# Patient Record
Sex: FEMALE | Race: WHITE
[De-identification: names, ages, dates, MRNs, and addresses within clinical notes are randomized per-mention and may not be internally consistent; named-entity substitution may affect disease eponyms.]

---

## 2017-11-22 ENCOUNTER — RX ONLY (RX ONLY)
Age: 37
End: 2017-11-22

## 2017-11-22 ENCOUNTER — DOCTOR'S OFFICE (OUTPATIENT)
Dept: URBAN - NONMETROPOLITAN AREA CLINIC 1 | Facility: CLINIC | Age: 37
Setting detail: OPHTHALMOLOGY
End: 2017-11-22
Payer: COMMERCIAL

## 2017-11-22 DIAGNOSIS — H02.401: ICD-10-CM

## 2017-11-22 DIAGNOSIS — H01.001: ICD-10-CM

## 2017-11-22 DIAGNOSIS — H04.123: ICD-10-CM

## 2017-11-22 DIAGNOSIS — H10.13: ICD-10-CM

## 2017-11-22 DIAGNOSIS — H01.005: ICD-10-CM

## 2017-11-22 DIAGNOSIS — H01.004: ICD-10-CM

## 2017-11-22 DIAGNOSIS — H01.002: ICD-10-CM

## 2017-11-22 PROCEDURE — 92002 INTRM OPH EXAM NEW PATIENT: CPT | Performed by: OPHTHALMOLOGY

## 2017-11-22 ASSESSMENT — REFRACTION_MANIFEST
OD_VA1: 20/
OS_VA1: 20/
OD_VA2: 20/
OS_VA1: 20/
OU_VA: 20/
OU_VA: 20/
OD_VA3: 20/
OD_VA1: 20/
OS_VA1: 20/
OS_VA3: 20/
OD_VA3: 20/
OD_VA2: 20/
OS_VA2: 20/
OS_VA3: 20/
OD_VA3: 20/
OS_VA3: 20/
OD_VA1: 20/
OS_VA2: 20/
OS_VA2: 20/
OU_VA: 20/
OD_VA2: 20/

## 2017-11-22 ASSESSMENT — SUPERFICIAL PUNCTATE KERATITIS (SPK)
OD_SPK: 1+
OS_SPK: T

## 2017-11-22 ASSESSMENT — REFRACTION_AUTOREFRACTION
OS_AXIS: 098
OS_CYLINDER: -1.50
OD_CYLINDER: -1.50
OD_AXIS: 089
OD_SPHERE: +0.25
OS_SPHERE: +0.50

## 2017-11-22 ASSESSMENT — REFRACTION_CURRENTRX
OD_OVR_VA: 20/
OS_OVR_VA: 20/
OS_OVR_VA: 20/
OD_OVR_VA: 20/
OD_OVR_VA: 20/
OS_OVR_VA: 20/

## 2017-11-22 ASSESSMENT — VISUAL ACUITY
OS_BCVA: 20/30+2
OD_BCVA: 20/50-2

## 2017-11-22 ASSESSMENT — SPHEQUIV_DERIVED
OS_SPHEQUIV: -0.25
OD_SPHEQUIV: -0.5

## 2017-11-22 ASSESSMENT — LID EXAM ASSESSMENTS
OD_BLEPHARITIS: 1+
OS_BLEPHARITIS: 1+

## 2017-11-22 ASSESSMENT — LID POSITION - PTOSIS: OD_PTOSIS: RUL T

## 2018-05-02 ENCOUNTER — DOCTOR'S OFFICE (OUTPATIENT)
Dept: URBAN - NONMETROPOLITAN AREA CLINIC 1 | Facility: CLINIC | Age: 38
Setting detail: OPHTHALMOLOGY
End: 2018-05-02
Payer: COMMERCIAL

## 2018-05-02 DIAGNOSIS — H02.401: ICD-10-CM

## 2018-05-02 DIAGNOSIS — H04.123: ICD-10-CM

## 2018-05-02 DIAGNOSIS — H10.13: ICD-10-CM

## 2018-05-02 PROCEDURE — 92012 INTRM OPH EXAM EST PATIENT: CPT | Performed by: OPHTHALMOLOGY

## 2018-05-02 ASSESSMENT — SUPERFICIAL PUNCTATE KERATITIS (SPK)
OD_SPK: 1+
OS_SPK: T

## 2018-05-02 ASSESSMENT — REFRACTION_MANIFEST
OS_VA3: 20/
OD_VA2: 20/
OS_VA3: 20/
OS_VA1: 20/
OD_VA2: 20/
OU_VA: 20/
OS_VA2: 20/
OS_VA2: 20/
OD_VA3: 20/
OD_VA1: 20/
OD_VA1: 20/
OS_VA2: 20/
OS_VA1: 20/
OD_VA2: 20/
OD_VA3: 20/
OD_VA3: 20/
OS_VA1: 20/
OS_VA3: 20/
OU_VA: 20/
OU_VA: 20/
OD_VA1: 20/

## 2018-05-02 ASSESSMENT — REFRACTION_CURRENTRX
OS_OVR_VA: 20/
OD_OVR_VA: 20/
OS_OVR_VA: 20/
OS_OVR_VA: 20/
OD_OVR_VA: 20/
OD_OVR_VA: 20/

## 2018-05-02 ASSESSMENT — REFRACTION_AUTOREFRACTION
OD_AXIS: 081
OD_SPHERE: +0.75
OS_AXIS: 101
OS_CYLINDER: -2.50
OS_SPHERE: +1.50
OD_CYLINDER: -2.25

## 2018-05-02 ASSESSMENT — LID POSITION - PTOSIS: OD_PTOSIS: RUL T

## 2018-05-02 ASSESSMENT — SPHEQUIV_DERIVED
OD_SPHEQUIV: -0.375
OS_SPHEQUIV: 0.25

## 2018-05-02 ASSESSMENT — VISUAL ACUITY
OS_BCVA: 20/30
OD_BCVA: 20/40-2

## 2018-05-02 ASSESSMENT — CONFRONTATIONAL VISUAL FIELD TEST (CVF)
OD_FINDINGS: FULL
OS_FINDINGS: FULL

## 2018-05-02 ASSESSMENT — LID EXAM ASSESSMENTS
OS_BLEPHARITIS: 1+
OD_BLEPHARITIS: 1+

## 2020-02-11 ENCOUNTER — DOCTOR'S OFFICE (OUTPATIENT)
Dept: URBAN - NONMETROPOLITAN AREA CLINIC 1 | Facility: CLINIC | Age: 40
Setting detail: OPHTHALMOLOGY
End: 2020-02-11
Payer: COMMERCIAL

## 2020-02-11 VITALS — HEIGHT: 55 IN

## 2020-02-11 DIAGNOSIS — H02.401: ICD-10-CM

## 2020-02-11 DIAGNOSIS — H04.123: ICD-10-CM

## 2020-02-11 DIAGNOSIS — H10.13: ICD-10-CM

## 2020-02-11 PROCEDURE — 99214 OFFICE O/P EST MOD 30 MIN: CPT | Performed by: OPHTHALMOLOGY

## 2020-02-11 ASSESSMENT — LID EXAM ASSESSMENTS
OD_BLEPHARITIS: 1+
OS_BLEPHARITIS: 1+

## 2020-02-11 ASSESSMENT — REFRACTION_AUTOREFRACTION
OD_SPHERE: +0.75
OS_SPHERE: +1.50
OS_AXIS: 101
OS_CYLINDER: -2.50
OD_CYLINDER: -2.25
OD_AXIS: 081

## 2020-02-11 ASSESSMENT — SPHEQUIV_DERIVED
OD_SPHEQUIV: -0.375
OS_SPHEQUIV: 0.25

## 2020-02-11 ASSESSMENT — VISUAL ACUITY
OD_BCVA: 20/50
OS_BCVA: 20/40

## 2020-02-11 ASSESSMENT — SUPERFICIAL PUNCTATE KERATITIS (SPK)
OD_SPK: 1+
OS_SPK: T

## 2020-02-11 ASSESSMENT — LID POSITION - PTOSIS: OD_PTOSIS: RUL T

## 2020-02-11 ASSESSMENT — CONFRONTATIONAL VISUAL FIELD TEST (CVF)
OD_FINDINGS: FULL
OS_FINDINGS: FULL

## 2020-03-03 ENCOUNTER — DOCTOR'S OFFICE (OUTPATIENT)
Dept: URBAN - NONMETROPOLITAN AREA CLINIC 1 | Facility: CLINIC | Age: 40
Setting detail: OPHTHALMOLOGY
End: 2020-03-03
Payer: COMMERCIAL

## 2020-03-03 ENCOUNTER — OPTICAL OFFICE (OUTPATIENT)
Dept: URBAN - NONMETROPOLITAN AREA CLINIC 4 | Facility: CLINIC | Age: 40
Setting detail: OPHTHALMOLOGY
End: 2020-03-03
Payer: COMMERCIAL

## 2020-03-03 VITALS — HEIGHT: 55 IN

## 2020-03-03 DIAGNOSIS — H52.223: ICD-10-CM

## 2020-03-03 DIAGNOSIS — H52.03: ICD-10-CM

## 2020-03-03 PROCEDURE — V2784 LENS POLYCARB OR EQUAL: HCPCS | Performed by: OPTOMETRIST

## 2020-03-03 PROCEDURE — V2103 SPHEROCYLINDR 4.00D/12-2.00D: HCPCS | Performed by: OPTOMETRIST

## 2020-03-03 PROCEDURE — V2020 VISION SVCS FRAMES PURCHASES: HCPCS | Performed by: OPTOMETRIST

## 2020-03-03 PROCEDURE — V2102 SINGL VISN SPHERE 7.12-20.00: HCPCS | Performed by: OPTOMETRIST

## 2020-03-03 PROCEDURE — 92015 DETERMINE REFRACTIVE STATE: CPT | Performed by: OPTOMETRIST

## 2020-03-03 ASSESSMENT — REFRACTION_MANIFEST
OD_CYLINDER: -1.50
OS_AXIS: 095
OD_VA1: 20/25
OS_CYLINDER: -1.25
OD_AXIS: 090
OS_VA1: 20/25-2
OS_VA2: 20/25
OD_VA2: 20/25
OS_SPHERE: +0.25
OD_SPHERE: +0.50

## 2020-03-03 ASSESSMENT — SPHEQUIV_DERIVED
OD_SPHEQUIV: -0.25
OS_SPHEQUIV: -0.375
OS_SPHEQUIV: 0.75
OD_SPHEQUIV: 0.125

## 2020-03-03 ASSESSMENT — REFRACTION_AUTOREFRACTION
OD_AXIS: 83
OS_CYLINDER: +1.00
OS_SPHERE: +0.25
OD_SPHERE: +1.00
OS_AXIS: 1.00
OD_CYLINDER: -1.75

## 2020-03-03 ASSESSMENT — VISUAL ACUITY
OD_BCVA: 20/60-1
OS_BCVA: 20/25-2

## 2020-11-20 ENCOUNTER — DOCTOR'S OFFICE (OUTPATIENT)
Dept: URBAN - NONMETROPOLITAN AREA CLINIC 1 | Facility: CLINIC | Age: 40
Setting detail: OPHTHALMOLOGY
End: 2020-11-20
Payer: COMMERCIAL

## 2020-11-20 ENCOUNTER — RX ONLY (RX ONLY)
Age: 40
End: 2020-11-20

## 2020-11-20 ENCOUNTER — OPTICAL OFFICE (OUTPATIENT)
Dept: URBAN - NONMETROPOLITAN AREA CLINIC 4 | Facility: CLINIC | Age: 40
Setting detail: OPHTHALMOLOGY
End: 2020-11-20
Payer: COMMERCIAL

## 2020-11-20 DIAGNOSIS — H02.401: ICD-10-CM

## 2020-11-20 DIAGNOSIS — H04.123: ICD-10-CM

## 2020-11-20 DIAGNOSIS — H01.001: ICD-10-CM

## 2020-11-20 DIAGNOSIS — H01.002: ICD-10-CM

## 2020-11-20 DIAGNOSIS — H52.223: ICD-10-CM

## 2020-11-20 PROBLEM — H15.111 EPISCLERITIS; RIGHT EYE: Status: ACTIVE | Noted: 2020-11-20

## 2020-11-20 PROBLEM — H01.004 BLEPHARITIS; RIGHT UPPER LID, RIGHT LOWER LID, LEFT UPPER LID, LEFT LOWER LID: Status: ACTIVE | Noted: 2017-11-22

## 2020-11-20 PROBLEM — L71.8: Status: ACTIVE | Noted: 2020-11-20

## 2020-11-20 PROBLEM — H01.005 BLEPHARITIS; RIGHT UPPER LID, RIGHT LOWER LID, LEFT UPPER LID, LEFT LOWER LID: Status: ACTIVE | Noted: 2017-11-22

## 2020-11-20 PROBLEM — H10.13 ALLERGIC CONJUNCTIVITIS; BOTH EYES: Status: RESOLVED | Noted: 2017-11-22 | Resolved: 2020-11-20

## 2020-11-20 PROCEDURE — V2103 SPHEROCYLINDR 4.00D/12-2.00D: HCPCS | Performed by: OPTOMETRIST

## 2020-11-20 PROCEDURE — 92012 INTRM OPH EXAM EST PATIENT: CPT | Performed by: OPTOMETRIST

## 2020-11-20 PROCEDURE — V2102 SINGL VISN SPHERE 7.12-20.00: HCPCS | Performed by: OPTOMETRIST

## 2020-11-20 PROCEDURE — V2784 LENS POLYCARB OR EQUAL: HCPCS | Performed by: OPTOMETRIST

## 2020-11-20 PROCEDURE — V2020 VISION SVCS FRAMES PURCHASES: HCPCS | Performed by: OPTOMETRIST

## 2020-11-20 ASSESSMENT — VISUAL ACUITY
OS_BCVA: 20/30-2
OD_BCVA: 20/60-2

## 2020-11-20 ASSESSMENT — REFRACTION_AUTOREFRACTION
OS_SPHERE: +0.25
OD_AXIS: 83
OS_AXIS: 1.00
OD_CYLINDER: -1.75
OS_CYLINDER: +1.00
OD_SPHERE: +1.00

## 2020-11-20 ASSESSMENT — SPHEQUIV_DERIVED
OS_SPHEQUIV: 0.75
OD_SPHEQUIV: 0.125
OS_SPHEQUIV: -0.375
OD_SPHEQUIV: -0.25

## 2020-11-20 ASSESSMENT — REFRACTION_MANIFEST
OS_AXIS: 095
OD_SPHERE: +0.50
OD_CYLINDER: -1.50
OD_VA2: 20/25
OS_CYLINDER: -1.25
OD_VA1: 20/25
OD_AXIS: 090
OS_SPHERE: +0.25
OS_VA2: 20/25
OS_VA1: 20/25-2

## 2020-11-20 ASSESSMENT — LID EXAM ASSESSMENTS
OS_BLEPHARITIS: 1+
OD_BLEPHARITIS: 1+

## 2020-11-20 ASSESSMENT — SUPERFICIAL PUNCTATE KERATITIS (SPK)
OS_SPK: T
OD_SPK: 1+

## 2020-11-20 ASSESSMENT — TONOMETRY
OD_IOP_MMHG: 18
OS_IOP_MMHG: 18

## 2020-11-20 ASSESSMENT — CONFRONTATIONAL VISUAL FIELD TEST (CVF)
OS_FINDINGS: FULL
OD_FINDINGS: FULL

## 2020-11-20 ASSESSMENT — LID POSITION - PTOSIS: OD_PTOSIS: RUL T

## 2023-08-30 ENCOUNTER — DOCTOR'S OFFICE (OUTPATIENT)
Dept: URBAN - NONMETROPOLITAN AREA CLINIC 1 | Facility: CLINIC | Age: 43
Setting detail: OPHTHALMOLOGY
End: 2023-08-30
Payer: COMMERCIAL

## 2023-08-30 VITALS — HEIGHT: 55 IN

## 2023-08-30 DIAGNOSIS — H10.13: ICD-10-CM

## 2023-08-30 DIAGNOSIS — H35.373: ICD-10-CM

## 2023-08-30 DIAGNOSIS — H43.811: ICD-10-CM

## 2023-08-30 DIAGNOSIS — D23.121: ICD-10-CM

## 2023-08-30 DIAGNOSIS — H25.13: ICD-10-CM

## 2023-08-30 DIAGNOSIS — H43.392: ICD-10-CM

## 2023-08-30 DIAGNOSIS — H11.153: ICD-10-CM

## 2023-08-30 PROCEDURE — 92285 EXTERNAL OCULAR PHOTOGRAPHY: CPT | Performed by: OPHTHALMOLOGY

## 2023-08-30 PROCEDURE — 92134 CPTRZ OPH DX IMG PST SGM RTA: CPT | Performed by: OPHTHALMOLOGY

## 2023-08-30 PROCEDURE — 92014 COMPRE OPH EXAM EST PT 1/>: CPT | Performed by: OPHTHALMOLOGY

## 2023-08-30 ASSESSMENT — VISUAL ACUITY
OD_BCVA: 20/40-2
OS_BCVA: 20/30+2

## 2023-08-30 ASSESSMENT — KERATOMETRY
OD_AXISANGLE_DEGREES: 158
OS_K1POWER_DIOPTERS: 42.25
OS_AXISANGLE_DEGREES: 017
OS_K2POWER_DIOPTERS: 43.50
OD_K2POWER_DIOPTERS: 44.00
OD_K1POWER_DIOPTERS: 42.75

## 2023-08-30 ASSESSMENT — AXIALLENGTH_DERIVED
OD_AL: 23.7361
OD_AL: 23.7855
OS_AL: 23.9737
OS_AL: 23.8237

## 2023-08-30 ASSESSMENT — REFRACTION_AUTOREFRACTION
OS_CYLINDER: -2.50
OD_CYLINDER: -2.25
OD_AXIS: 091
OD_SPHERE: +0.75
OS_SPHERE: +1.25
OS_AXIS: 100

## 2023-08-30 ASSESSMENT — SPHEQUIV_DERIVED
OS_SPHEQUIV: 0
OD_SPHEQUIV: -0.375
OD_SPHEQUIV: -0.25
OS_SPHEQUIV: -0.375

## 2023-08-30 ASSESSMENT — REFRACTION_MANIFEST
OD_AXIS: 090
OD_VA2: 20/25
OS_VA1: 20/25-2
OD_VA1: 20/25
OS_SPHERE: +0.25
OD_CYLINDER: -1.50
OS_VA2: 20/25
OS_AXIS: 095
OD_SPHERE: +0.50
OS_CYLINDER: -1.25

## 2023-08-30 ASSESSMENT — REFRACTION_CURRENTRX
OD_OVR_VA: 20/
OS_OVR_VA: 20/

## 2023-08-30 ASSESSMENT — CONFRONTATIONAL VISUAL FIELD TEST (CVF)
OS_FINDINGS: FULL
OD_FINDINGS: FULL

## 2023-12-27 ENCOUNTER — DOCTOR'S OFFICE (OUTPATIENT)
Dept: URBAN - NONMETROPOLITAN AREA CLINIC 1 | Facility: CLINIC | Age: 43
Setting detail: OPHTHALMOLOGY
End: 2023-12-27
Payer: COMMERCIAL

## 2023-12-27 ENCOUNTER — RX ONLY (RX ONLY)
Age: 43
End: 2023-12-27

## 2023-12-27 DIAGNOSIS — H10.13: ICD-10-CM

## 2023-12-27 DIAGNOSIS — D23.121: ICD-10-CM

## 2023-12-27 PROCEDURE — 99213 OFFICE O/P EST LOW 20 MIN: CPT | Performed by: OPHTHALMOLOGY

## 2023-12-27 ASSESSMENT — REFRACTION_AUTOREFRACTION
OS_SPHERE: +1.00
OS_AXIS: 094
OD_SPHERE: +1.00
OD_AXIS: 085
OD_CYLINDER: -2.50
OS_CYLINDER: -1.75

## 2023-12-27 ASSESSMENT — CONFRONTATIONAL VISUAL FIELD TEST (CVF)
OD_FINDINGS: FULL
OS_FINDINGS: FULL

## 2023-12-27 ASSESSMENT — REFRACTION_MANIFEST
OD_SPHERE: +0.50
OD_CYLINDER: -1.50
OS_AXIS: 095
OD_AXIS: 090
OS_VA1: 20/25-2
OD_VA2: 20/25
OD_VA1: 20/25
OS_SPHERE: +0.25
OS_VA2: 20/25
OS_CYLINDER: -1.25

## 2023-12-27 ASSESSMENT — SPHEQUIV_DERIVED
OD_SPHEQUIV: -0.25
OS_SPHEQUIV: 0.125
OS_SPHEQUIV: -0.375
OD_SPHEQUIV: -0.25

## 2023-12-27 ASSESSMENT — REFRACTION_CURRENTRX
OD_OVR_VA: 20/
OS_OVR_VA: 20/

## 2024-01-19 ENCOUNTER — DOCTOR'S OFFICE (OUTPATIENT)
Dept: URBAN - NONMETROPOLITAN AREA CLINIC 1 | Facility: CLINIC | Age: 44
Setting detail: OPHTHALMOLOGY
End: 2024-01-19
Payer: COMMERCIAL

## 2024-01-19 ENCOUNTER — RX ONLY (RX ONLY)
Age: 44
End: 2024-01-19

## 2024-01-19 DIAGNOSIS — H01.001: ICD-10-CM

## 2024-01-19 DIAGNOSIS — H10.13: ICD-10-CM

## 2024-01-19 DIAGNOSIS — D23.121: ICD-10-CM

## 2024-01-19 DIAGNOSIS — H01.004: ICD-10-CM

## 2024-01-19 PROCEDURE — 99213 OFFICE O/P EST LOW 20 MIN: CPT | Performed by: OPHTHALMOLOGY

## 2024-01-19 ASSESSMENT — REFRACTION_AUTOREFRACTION
OD_CYLINDER: -2.00
OD_SPHERE: +0.25
OS_CYLINDER: -2.50
OS_AXIS: 098
OS_SPHERE: +0.75
OD_AXIS: 075

## 2024-01-19 ASSESSMENT — REFRACTION_MANIFEST
OS_VA1: 20/25-2
OS_SPHERE: +0.25
OD_AXIS: 090
OD_SPHERE: +0.50
OD_VA1: 20/25
OS_AXIS: 095
OS_VA2: 20/25
OD_VA2: 20/25
OS_CYLINDER: -1.25
OD_CYLINDER: -1.50

## 2024-01-19 ASSESSMENT — LID EXAM ASSESSMENTS
OD_BLEPHARITIS: 1+
OS_BLEPHARITIS: 1+

## 2024-01-19 ASSESSMENT — SPHEQUIV_DERIVED
OD_SPHEQUIV: -0.75
OS_SPHEQUIV: -0.5
OD_SPHEQUIV: -0.25
OS_SPHEQUIV: -0.375

## 2024-01-19 ASSESSMENT — REFRACTION_CURRENTRX
OS_OVR_VA: 20/
OD_OVR_VA: 20/

## 2024-01-19 ASSESSMENT — CONFRONTATIONAL VISUAL FIELD TEST (CVF)
OD_FINDINGS: FULL
OS_FINDINGS: FULL

## 2024-02-08 ENCOUNTER — APPOINTMENT (RX ONLY)
Dept: URBAN - NONMETROPOLITAN AREA CLINIC 4 | Facility: CLINIC | Age: 44
Setting detail: DERMATOLOGY
End: 2024-02-08

## 2024-02-08 DIAGNOSIS — L24.2 IRRITANT CONTACT DERMATITIS DUE TO SOLVENTS: ICD-10-CM | Status: INADEQUATELY CONTROLLED

## 2024-02-08 PROCEDURE — ? TREATMENT REGIMEN

## 2024-02-08 PROCEDURE — ? COUNSELING

## 2024-02-08 PROCEDURE — 99203 OFFICE O/P NEW LOW 30 MIN: CPT

## 2024-02-08 PROCEDURE — ? PRESCRIPTION

## 2024-02-08 RX ORDER — HYDROCORTISONE 25 MG/G
2.5% CREAM TOPICAL BID
Qty: 30 | Refills: 2 | Status: ERX | COMMUNITY
Start: 2024-02-08

## 2024-02-08 RX ORDER — CETIRIZINE HCL 1 MG/ML
10MG SOLUTION, ORAL ORAL QD
Qty: 30 | Refills: 3 | Status: ERX | COMMUNITY
Start: 2024-02-08

## 2024-02-08 RX ADMIN — Medication 10MG: at 00:00

## 2024-02-08 RX ADMIN — HYDROCORTISONE 2.5%: 25 CREAM TOPICAL at 00:00

## 2024-02-08 ASSESSMENT — ITCH NUMERIC RATING SCALE: HOW SEVERE IS YOUR ITCHING?: 4

## 2024-02-08 ASSESSMENT — LOCATION ZONE DERM: LOCATION ZONE: FACE

## 2024-02-08 ASSESSMENT — PAIN INTENSITY VAS: HOW INTENSE IS YOUR PAIN 0 BEING NO PAIN, 10 BEING THE MOST SEVERE PAIN POSSIBLE?: NO PAIN

## 2024-02-08 ASSESSMENT — LOCATION SIMPLE DESCRIPTION DERM
LOCATION SIMPLE: RIGHT CHEEK
LOCATION SIMPLE: LEFT CHEEK

## 2024-02-08 ASSESSMENT — LOCATION DETAILED DESCRIPTION DERM
LOCATION DETAILED: LEFT INFERIOR CENTRAL MALAR CHEEK
LOCATION DETAILED: RIGHT SUPERIOR MEDIAL BUCCAL CHEEK

## 2024-02-08 ASSESSMENT — SEVERITY ASSESSMENT 2020: SEVERITY 2020: MILD

## 2024-02-08 ASSESSMENT — BSA RASH: BSA RASH: 8

## 2024-02-08 NOTE — PROCEDURE: TREATMENT REGIMEN
Samples Given: Cetaphil Moisturizer cream + Cetaphil Gentle cleanser .
Plan: Will follow up in 6 weeks .
Initiate Treatment: Hydrocortisone 2.5% topical cream Mix 50/50 Equal Amounts of Cetaphil moisturizer recommended AA on face BID as needed for irritation ; Zyrtec 10mg QD .
Detail Level: Zone

## 2024-03-01 ENCOUNTER — RX ONLY (OUTPATIENT)
Age: 44
Setting detail: RX ONLY
End: 2024-03-01

## 2024-03-01 RX ORDER — CETIRIZINE HCL 1 MG/ML
10MG SOLUTION, ORAL ORAL QD
Qty: 90 | Refills: 3 | Status: ERX

## 2024-03-13 ENCOUNTER — APPOINTMENT (RX ONLY)
Dept: URBAN - NONMETROPOLITAN AREA CLINIC 4 | Facility: CLINIC | Age: 44
Setting detail: DERMATOLOGY
End: 2024-03-13

## 2024-03-13 DIAGNOSIS — L24.2 IRRITANT CONTACT DERMATITIS DUE TO SOLVENTS: ICD-10-CM | Status: RESOLVED

## 2024-03-13 PROCEDURE — ? PRESCRIPTION MEDICATION MANAGEMENT

## 2024-03-13 PROCEDURE — 99213 OFFICE O/P EST LOW 20 MIN: CPT

## 2024-03-13 PROCEDURE — ? COUNSELING

## 2024-03-13 ASSESSMENT — LOCATION DETAILED DESCRIPTION DERM
LOCATION DETAILED: LEFT INFERIOR MEDIAL MALAR CHEEK
LOCATION DETAILED: RIGHT INFERIOR CENTRAL MALAR CHEEK

## 2024-03-13 ASSESSMENT — BSA RASH: BSA RASH: 2

## 2024-03-13 ASSESSMENT — LOCATION SIMPLE DESCRIPTION DERM
LOCATION SIMPLE: LEFT CHEEK
LOCATION SIMPLE: RIGHT CHEEK

## 2024-03-13 ASSESSMENT — ITCH NUMERIC RATING SCALE: HOW SEVERE IS YOUR ITCHING?: 2

## 2024-03-13 ASSESSMENT — LOCATION ZONE DERM: LOCATION ZONE: FACE

## 2024-03-13 ASSESSMENT — SEVERITY ASSESSMENT 2020: SEVERITY 2020: CLEAR

## 2024-03-13 NOTE — PROCEDURE: PRESCRIPTION MEDICATION MANAGEMENT
Discontinue Regimen: Hydrocortisone 2.5% cream
Continue Regimen: Zyrtec 10 mg
Detail Level: Zone
Render In Strict Bullet Format?: No

## 2024-03-15 ENCOUNTER — DOCTOR'S OFFICE (OUTPATIENT)
Dept: URBAN - NONMETROPOLITAN AREA CLINIC 1 | Facility: CLINIC | Age: 44
Setting detail: OPHTHALMOLOGY
End: 2024-03-15
Payer: COMMERCIAL

## 2024-03-15 DIAGNOSIS — H01.004: ICD-10-CM

## 2024-03-15 DIAGNOSIS — H01.001: ICD-10-CM

## 2024-03-15 DIAGNOSIS — H10.13: ICD-10-CM

## 2024-03-15 PROCEDURE — 99213 OFFICE O/P EST LOW 20 MIN: CPT | Performed by: OPHTHALMOLOGY

## 2024-03-15 ASSESSMENT — LID EXAM ASSESSMENTS
OS_BLEPHARITIS: 1+
OD_BLEPHARITIS: 1+

## 2024-03-22 ENCOUNTER — DOCTOR'S OFFICE (OUTPATIENT)
Dept: URBAN - NONMETROPOLITAN AREA CLINIC 1 | Facility: CLINIC | Age: 44
Setting detail: OPHTHALMOLOGY
End: 2024-03-22
Payer: COMMERCIAL

## 2024-03-22 ENCOUNTER — OPTICAL OFFICE (OUTPATIENT)
Dept: URBAN - NONMETROPOLITAN AREA CLINIC 4 | Facility: CLINIC | Age: 44
Setting detail: OPHTHALMOLOGY
End: 2024-03-22
Payer: COMMERCIAL

## 2024-03-22 ENCOUNTER — RX ONLY (RX ONLY)
Age: 44
End: 2024-03-22

## 2024-03-22 DIAGNOSIS — H52.223: ICD-10-CM

## 2024-03-22 DIAGNOSIS — H52.4: ICD-10-CM

## 2024-03-22 PROCEDURE — V2020 VISION SVCS FRAMES PURCHASES: HCPCS | Performed by: OPTOMETRIST

## 2024-03-22 PROCEDURE — V2784 LENS POLYCARB OR EQUAL: HCPCS | Performed by: OPTOMETRIST

## 2024-03-22 PROCEDURE — V2784 LENS POLYCARB OR EQUAL: HCPCS | Mod: LT | Performed by: OPTOMETRIST

## 2024-03-22 PROCEDURE — V2204 LENS SPHCY BIFOCAL 4.00D/2.1: HCPCS | Mod: LT | Performed by: OPTOMETRIST

## 2024-03-22 PROCEDURE — V2204 LENS SPHCY BIFOCAL 4.00D/2.1: HCPCS | Performed by: OPTOMETRIST

## 2024-03-22 PROCEDURE — 92012 INTRM OPH EXAM EST PATIENT: CPT | Performed by: OPTOMETRIST

## 2024-03-22 PROCEDURE — 92015 DETERMINE REFRACTIVE STATE: CPT | Performed by: OPTOMETRIST

## 2024-03-22 ASSESSMENT — LID EXAM ASSESSMENTS
OS_BLEPHARITIS: 1+
OD_BLEPHARITIS: 1+

## 2024-05-17 ENCOUNTER — DOCTOR'S OFFICE (OUTPATIENT)
Dept: URBAN - NONMETROPOLITAN AREA CLINIC 1 | Facility: CLINIC | Age: 44
Setting detail: OPHTHALMOLOGY
End: 2024-05-17
Payer: COMMERCIAL

## 2024-05-17 VITALS — HEIGHT: 55 IN

## 2024-05-17 DIAGNOSIS — H10.13: ICD-10-CM

## 2024-05-17 PROCEDURE — 99213 OFFICE O/P EST LOW 20 MIN: CPT | Performed by: OPHTHALMOLOGY

## 2024-05-17 ASSESSMENT — LID EXAM ASSESSMENTS
OD_BLEPHARITIS: 1+
OS_BLEPHARITIS: 1+

## 2024-05-24 ENCOUNTER — OPTICAL OFFICE (OUTPATIENT)
Dept: URBAN - NONMETROPOLITAN AREA CLINIC 4 | Facility: CLINIC | Age: 44
Setting detail: OPHTHALMOLOGY
End: 2024-05-24
Payer: COMMERCIAL

## 2024-05-24 DIAGNOSIS — H52.223: ICD-10-CM

## 2024-05-24 PROCEDURE — V2784 LENS POLYCARB OR EQUAL: HCPCS | Performed by: OPTOMETRIST

## 2024-05-24 PROCEDURE — V2204 LENS SPHCY BIFOCAL 4.00D/2.1: HCPCS | Performed by: OPTOMETRIST

## 2024-05-24 PROCEDURE — V2204 LENS SPHCY BIFOCAL 4.00D/2.1: HCPCS | Mod: LT | Performed by: OPTOMETRIST

## 2024-05-24 PROCEDURE — V2784 LENS POLYCARB OR EQUAL: HCPCS | Mod: LT | Performed by: OPTOMETRIST

## 2024-05-24 PROCEDURE — V2020 VISION SVCS FRAMES PURCHASES: HCPCS | Performed by: OPTOMETRIST

## 2024-09-25 ENCOUNTER — DOCTOR'S OFFICE (OUTPATIENT)
Dept: URBAN - NONMETROPOLITAN AREA CLINIC 1 | Facility: CLINIC | Age: 44
Setting detail: OPHTHALMOLOGY
End: 2024-09-25
Payer: COMMERCIAL

## 2024-09-25 DIAGNOSIS — H01.001: ICD-10-CM

## 2024-09-25 DIAGNOSIS — H02.015: ICD-10-CM

## 2024-09-25 DIAGNOSIS — H10.503: ICD-10-CM

## 2024-09-25 DIAGNOSIS — H02.011: ICD-10-CM

## 2024-09-25 DIAGNOSIS — H01.004: ICD-10-CM

## 2024-09-25 DIAGNOSIS — H04.551: ICD-10-CM

## 2024-09-25 DIAGNOSIS — H02.012: ICD-10-CM

## 2024-09-25 DIAGNOSIS — H25.13: ICD-10-CM

## 2024-09-25 DIAGNOSIS — H02.014: ICD-10-CM

## 2024-09-25 PROBLEM — H11.153 PINGUECULA;  ,, BOTH EYES: Status: ACTIVE | Noted: 2024-09-25

## 2024-09-25 PROCEDURE — 92012 INTRM OPH EXAM EST PATIENT: CPT | Performed by: OPHTHALMOLOGY

## 2024-09-25 ASSESSMENT — REFRACTION_AUTOREFRACTION
OD_CYLINDER: -2.25
OS_CYLINDER: -2.25
OD_SPHERE: +2.25
OD_AXIS: 056
OS_SPHERE: +1.50
OS_AXIS: 095

## 2024-09-25 ASSESSMENT — CONFRONTATIONAL VISUAL FIELD TEST (CVF)
OS_FINDINGS: FULL
OD_FINDINGS: FULL

## 2024-09-25 ASSESSMENT — REFRACTION_MANIFEST
OS_VA1: 20/25-2
OD_ADD: +1.25
OS_AXIS: 100
OD_CYLINDER: -2.50
OS_VA2: 20/25
OD_AXIS: 090
OS_SPHERE: +1.00
OS_CYLINDER: -2.50
OD_SPHERE: +1.25
OD_VA1: 20/25
OS_ADD: +1.25
OD_VA2: 20/25

## 2024-09-25 ASSESSMENT — TONOMETRY
OS_IOP_MMHG: 15
OD_IOP_MMHG: 15

## 2024-09-25 ASSESSMENT — VISUAL ACUITY
OS_BCVA: 20/30+2
OD_BCVA: 20/60-1

## 2024-09-25 ASSESSMENT — KERATOMETRY
OS_AXISANGLE_DEGREES: 018
OD_AXISANGLE_DEGREES: 168
OD_K2POWER_DIOPTERS: 43.25
OS_K1POWER_DIOPTERS: 42.25
OS_K2POWER_DIOPTERS: 43.50
OD_K1POWER_DIOPTERS: 41.50

## 2024-09-25 ASSESSMENT — LID EXAM ASSESSMENTS
OD_TRICHIASIS: RLL RUL
OS_TRICHIASIS: LLL LUL

## 2024-09-25 ASSESSMENT — REFRACTION_CURRENTRX
OD_OVR_VA: 20/
OS_OVR_VA: 20/

## 2025-06-30 ENCOUNTER — OPTICAL OFFICE (OUTPATIENT)
Dept: URBAN - NONMETROPOLITAN AREA CLINIC 4 | Facility: CLINIC | Age: 45
Setting detail: OPHTHALMOLOGY
End: 2025-06-30
Payer: COMMERCIAL

## 2025-06-30 DIAGNOSIS — H52.223: ICD-10-CM

## 2025-06-30 PROCEDURE — V2204 LENS SPHCY BIFOCAL 4.00D/2.1: HCPCS | Performed by: OPTOMETRIST

## 2025-06-30 PROCEDURE — V2784 LENS POLYCARB OR EQUAL: HCPCS | Mod: LT | Performed by: OPTOMETRIST

## 2025-06-30 PROCEDURE — V2784 LENS POLYCARB OR EQUAL: HCPCS | Performed by: OPTOMETRIST

## 2025-06-30 PROCEDURE — V2204 LENS SPHCY BIFOCAL 4.00D/2.1: HCPCS | Mod: LT | Performed by: OPTOMETRIST

## 2025-06-30 PROCEDURE — V2020 VISION SVCS FRAMES PURCHASES: HCPCS | Performed by: OPTOMETRIST

## 2025-07-01 ENCOUNTER — OPTICAL OFFICE (OUTPATIENT)
Dept: URBAN - NONMETROPOLITAN AREA CLINIC 4 | Facility: CLINIC | Age: 45
Setting detail: OPHTHALMOLOGY
End: 2025-07-01
Payer: COMMERCIAL

## 2025-07-01 DIAGNOSIS — H52.223: ICD-10-CM

## 2025-07-01 PROCEDURE — V2784 LENS POLYCARB OR EQUAL: HCPCS | Mod: LT | Performed by: OPTOMETRIST

## 2025-07-01 PROCEDURE — V2020 VISION SVCS FRAMES PURCHASES: HCPCS | Performed by: OPTOMETRIST

## 2025-07-01 PROCEDURE — V2204 LENS SPHCY BIFOCAL 4.00D/2.1: HCPCS | Mod: LT | Performed by: OPTOMETRIST

## 2025-07-01 PROCEDURE — V2204 LENS SPHCY BIFOCAL 4.00D/2.1: HCPCS | Performed by: OPTOMETRIST

## 2025-07-01 PROCEDURE — V2784 LENS POLYCARB OR EQUAL: HCPCS | Performed by: OPTOMETRIST
